# Patient Record
Sex: MALE | Race: WHITE | NOT HISPANIC OR LATINO | URBAN - METROPOLITAN AREA
[De-identification: names, ages, dates, MRNs, and addresses within clinical notes are randomized per-mention and may not be internally consistent; named-entity substitution may affect disease eponyms.]

---
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
## 2021-08-03 NOTE — ED PROVIDER NOTE - MUSCULOSKELETAL, MLM
Alert and oriented, no focal deficits, no motor or sensory deficits. Spine appears normal, range of motion is not limited, no muscle or joint tenderness

## 2025-01-26 NOTE — ED PROVIDER NOTE - CARDIAC, MLM
Refill Routing Note   Medication(s) are not appropriate for processing by [facility] for the following reason(s):        Drug-disease interaction: allopurinoL and Chronic kidney disease, stage 3a     [facility] action(s):  Defer               Appointments  past 12m or future 3m with PCP    Date Provider   Last Visit   1/8/2025 Andrew Major MD   Next Visit   7/16/2025 Andrew Major MD   ED visits in past 90 days: 0        Note composed:2:28 PM 01/26/2025           Normal rate, regular rhythm.  Heart sounds S1, S2.  No murmurs, rubs or gallops.